# Patient Record
Sex: FEMALE | Race: WHITE
[De-identification: names, ages, dates, MRNs, and addresses within clinical notes are randomized per-mention and may not be internally consistent; named-entity substitution may affect disease eponyms.]

---

## 2017-04-27 ENCOUNTER — HOSPITAL ENCOUNTER (OUTPATIENT)
Dept: HOSPITAL 45 - C.MAMM | Age: 52
Discharge: HOME | End: 2017-04-27
Attending: NURSE PRACTITIONER
Payer: COMMERCIAL

## 2017-04-27 DIAGNOSIS — Z12.31: Primary | ICD-10-CM

## 2017-04-27 NOTE — MAMMOGRAPHY REPORT
BILATERAL DIGITAL SCREENING MAMMOGRAM TOMOSYNTHESIS WITH CAD: 4/27/2017

CLINICAL HISTORY: Routine screening.  Patient has no complaints.  





TECHNIQUE:  Breast tomosynthesis in addition to standard 2D mammography was performed. Current study
 was also evaluated with a Computer Aided Detection (CAD) system.  



COMPARISON: Comparison is made to exams dated:  2/9/2016 mammogram and 12/3/2009 mammogram - [facility].   



BREAST COMPOSITION:  The tissue of both breasts is almost entirely fatty.  



FINDINGS:  No suspicious masses, calcifications, or areas of architectural distortion are noted in e
ither breast. There has been no significant interval change compared to prior exams.  



IMPRESSION:  ACR BI-RADS CATEGORY 1: NEGATIVE

There is no mammographic evidence of malignancy. A 1 year screening mammogram is recommended.  The p
atient will receive written notification of the results.  





Approximately 10% of breast cancers are not detected with mammography. A negative mammographic repor
t should not delay biopsy if a clinically suggestive mass is present.



Delaney Worthington M.D.          

ah/:4/27/2017 15:37:31  



Imaging Technologist: Serenity English RT(R)(M), [facility]

letter sent: Normal 1/2  

BI-RADS Code: ACR BI-RADS Category 1: Negative

## 2017-07-05 ENCOUNTER — HOSPITAL ENCOUNTER (OUTPATIENT)
Dept: HOSPITAL 45 - C.PAPS | Age: 52
Discharge: HOME | End: 2017-07-05
Attending: OBSTETRICS & GYNECOLOGY
Payer: COMMERCIAL

## 2017-07-05 DIAGNOSIS — Z01.419: Primary | ICD-10-CM

## 2018-04-19 ENCOUNTER — HOSPITAL ENCOUNTER (OUTPATIENT)
Dept: HOSPITAL 45 - C.CTS | Age: 53
Discharge: HOME | End: 2018-04-19
Attending: FAMILY MEDICINE
Payer: COMMERCIAL

## 2018-04-19 DIAGNOSIS — R10.9: Primary | ICD-10-CM

## 2018-04-19 DIAGNOSIS — K57.32: ICD-10-CM

## 2018-04-19 NOTE — DIAGNOSTIC IMAGING REPORT
ABD/PELVIS IV AND ORAL CONT



CLINICAL HISTORY: 52 years-old Female presenting with new onset left sided

abdominal pain with h/o diverticulitis. 



TECHNIQUE: Multidetector CT of the abdomen and pelvis was performed after the

administration of oral and intravenous contrast. IV contrast: 93 mL of Optiray

320. A dose lowering technique was used consistent with the principles of ALARA

(as low as reasonably achievable). 



COMPARISON: None.



CT DOSE (mGy.cm): The estimated cumulative dose is 676.40 mGy.cm.



FINDINGS:



 topogram: Unremarkable.



Lung bases: Lungs and pleural spaces clear. Normal heart size. No pericardial or

pleural effusion. 



Liver: Normal morphology. Subcentimeter hypodense well-defined lesion in the

right hepatic lobe likely hepatic cyst or hamartoma. Patent hepatic vasculature.



Biliary: No intrahepatic or extrahepatic biliary ductal dilatation. Normal

gallbladder.



Pancreas: Normal.



Spleen: Lobular contour the spleen, nonspecific.



Adrenal glands: Normal.



Kidneys and ureters: Normal. No hydronephrosis.



Bladder: Incompletely evaluated secondary to underdistention.



Pelvic organs: Uterus and ovaries normal.



Bowel: Diverticulosis of the sigmoid and descending colon. Focal colonic wall

thickening and pericolonic inflammatory change at 3 sites along the descending

colon both proximally (series 3 image 111), in the midportion significantly

distally (series 3 image 186), and most severe at the junction with the sigmoid

colon (series 3 image 258). Adjacent peritoneal thickening and trace fluid in

the left paracolic gutter. Scattered colonic diverticula elsewhere in the colon.

The appendix is normal. No bowel obstruction. Several loops of small bowel

approach the inflamed descending colon and may be tethered to this region.



Peritoneal cavity: Trace free fluid in the left paracolic gutter as mentioned.

No pneumoperitoneum. No evidence of abscess.



Lymph nodes: No enlarged lymph nodes in the abdomen or pelvis.



Vasculature: Aorta and IVC patent and normal in caliber.



Abdominal wall: Normal.



Musculoskeletal: Degenerative changes of the spine.



IMPRESSION:

1.  Findings consistent with acute uncomplicated multifocal diverticulitis in

the descending colon, most severe at the junction of the descending and sigmoid

colon. No CT evidence of perforation or of abscess.











Electronically signed by:  Be Bess M.D.

4/19/2018 5:59 PM



Dictated Date/Time:  4/19/2018 5:54 PM

## 2018-04-30 ENCOUNTER — HOSPITAL ENCOUNTER (OUTPATIENT)
Dept: HOSPITAL 45 - C.MAMM | Age: 53
Discharge: HOME | End: 2018-04-30
Attending: NURSE PRACTITIONER
Payer: COMMERCIAL

## 2018-04-30 DIAGNOSIS — Z12.31: Primary | ICD-10-CM

## 2018-05-01 NOTE — MAMMOGRAPHY REPORT
BILATERAL DIGITAL SCREENING MAMMOGRAM TOMOSYNTHESIS WITH CAD: 4/30/2018

CLINICAL HISTORY: Routine screening.  Patient has no complaints.  





TECHNIQUE:  Breast tomosynthesis in addition to standard 2D mammography was performed. Current study 
was also evaluated with a Computer Aided Detection (CAD) system.  



COMPARISON: Comparison is made to exams dated:  4/27/2017 mammogram, 2/9/2016 mammogram, and 12/3/200
9 mammogram - [facility].   



BREAST COMPOSITION:  The tissue of both breasts is almost entirely fatty.  



FINDINGS: A 9 mm focal asymmetry in the upper inner quadrant of the right breast is stable in size da
ting back to at least 12/3/2009, therefore likely benign.  There is a stable intramammary lymph node 
in the right upper outer quadrant.  No suspicious mass, architectural distortion or cluster of microc
alcifications is seen.  



IMPRESSION:  ACR BI-RADS CATEGORY 1: NEGATIVE

There is no mammographic evidence of malignancy. A 1 year screening mammogram is recommended.  The pa
tient will receive written notification of the results.  





Approximately 10% of breast cancers are not detected with mammography. A negative mammographic report
 should not delay biopsy if a clinically suggestive mass is present.



Chantal Grant M.D.          

ay/:4/30/2018 15:42:58  



Imaging Technologist: Serenity MOORE(R)(M), [facility]

letter sent: Normal 1/2  

BI-RADS Code: ACR BI-RADS Category 1: Negative